# Patient Record
Sex: MALE | URBAN - METROPOLITAN AREA
[De-identification: names, ages, dates, MRNs, and addresses within clinical notes are randomized per-mention and may not be internally consistent; named-entity substitution may affect disease eponyms.]

---

## 2018-05-01 ENCOUNTER — APPOINTMENT (RX ONLY)
Dept: URBAN - METROPOLITAN AREA CLINIC 57 | Facility: CLINIC | Age: 48
Setting detail: DERMATOLOGY
End: 2018-05-01

## 2018-05-01 DIAGNOSIS — L40.4 GUTTATE PSORIASIS: ICD-10-CM

## 2018-05-01 PROBLEM — L40.0 PSORIASIS VULGARIS: Status: ACTIVE | Noted: 2018-05-01

## 2018-05-01 PROBLEM — J30.1 ALLERGIC RHINITIS DUE TO POLLEN: Status: ACTIVE | Noted: 2018-05-01

## 2018-05-01 PROBLEM — L85.3 XEROSIS CUTIS: Status: ACTIVE | Noted: 2018-05-01

## 2018-05-01 PROBLEM — L29.8 OTHER PRURITUS: Status: ACTIVE | Noted: 2018-05-01

## 2018-05-01 PROCEDURE — 99202 OFFICE O/P NEW SF 15 MIN: CPT

## 2018-05-01 PROCEDURE — ? COUNSELING

## 2018-05-01 PROCEDURE — ? PRESCRIPTION

## 2018-05-01 PROCEDURE — ? OTHER

## 2018-05-01 PROCEDURE — ? TREATMENT REGIMEN

## 2018-05-01 RX ORDER — CALCIPOTRIENE 50 UG/G
OINTMENT TOPICAL BID
Qty: 1 | Refills: 4 | Status: ERX | COMMUNITY
Start: 2018-05-01

## 2018-05-01 RX ORDER — FLUOCINONIDE 0.5 MG/ML
OINTMENT TOPICAL BID
Qty: 1 | Refills: 3 | Status: ERX | COMMUNITY
Start: 2018-05-01

## 2018-05-01 RX ADMIN — CALCIPOTRIENE: 50 OINTMENT TOPICAL at 16:50

## 2018-05-01 RX ADMIN — FLUOCINONIDE: 0.5 OINTMENT TOPICAL at 16:48

## 2018-05-01 ASSESSMENT — LOCATION DETAILED DESCRIPTION DERM
LOCATION DETAILED: LEFT DORSAL FOOT
LOCATION DETAILED: LEFT ULNAR DORSAL HAND
LOCATION DETAILED: LEFT ANTERIOR DISTAL THIGH
LOCATION DETAILED: RIGHT DISTAL POSTERIOR UPPER ARM
LOCATION DETAILED: PERIUMBILICAL SKIN
LOCATION DETAILED: RIGHT ULNAR DORSAL HAND
LOCATION DETAILED: RIGHT DORSAL FOOT
LOCATION DETAILED: SUPERIOR THORACIC SPINE
LOCATION DETAILED: LEFT DISTAL POSTERIOR UPPER ARM
LOCATION DETAILED: RIGHT ANTERIOR PROXIMAL THIGH

## 2018-05-01 ASSESSMENT — LOCATION SIMPLE DESCRIPTION DERM
LOCATION SIMPLE: LEFT THIGH
LOCATION SIMPLE: LEFT UPPER ARM
LOCATION SIMPLE: RIGHT UPPER ARM
LOCATION SIMPLE: RIGHT FOOT
LOCATION SIMPLE: UPPER BACK
LOCATION SIMPLE: RIGHT HAND
LOCATION SIMPLE: ABDOMEN
LOCATION SIMPLE: RIGHT THIGH
LOCATION SIMPLE: LEFT FOOT
LOCATION SIMPLE: LEFT HAND

## 2018-05-01 ASSESSMENT — LOCATION ZONE DERM
LOCATION ZONE: TRUNK
LOCATION ZONE: FEET
LOCATION ZONE: HAND
LOCATION ZONE: ARM
LOCATION ZONE: LEG

## 2018-05-01 ASSESSMENT — PGA PSORIASIS: PGA PSORIASIS: MILD (MILD PLAQUE ELEVATION, LIGHT ERYTHEMA, FINE SCALE PREDOMINATES)

## 2018-05-01 NOTE — HPI: RASH (PSORIASIS)
Do You Have A Family History Of Psoriasis?: no
How Severe Is Your Psoriasis?: moderate
Is This A New Presentation, Or A Follow-Up?: Psoriasis
Additional History: Patient has been on Humira for 8-9 years

## 2018-05-01 NOTE — PROCEDURE: OTHER
Other (Free Text): Pt first diagnosed with psoriasis at 12. It resolved and then flared again later in life. In January patient states that [place]. After this time, the significant stress caused his psoriasis to flare in February. In March the patient was prescribed a taper of prednisone starting at 50 mg (Mar 2-23rd). \\n\\nDiscussed changing biologic to Stelara. Pt has concerns about upsetting GI symptoms as he has Crohn’s. He has also lost a significant portion of his colon. \\nDiscussed other treatment options including : Otezla-  causes many GI issues including as diarrhea; Soriatane\\nPatient declines starting phototherapy treatment again.\\nWill consider trying IM Methotrexate (as likely wont absorb po) or Stelara. Pt will follow-up with GI Doctor\\n\\nHas tried and failed: Remicade, Olux foam, Clobetasol spray, phototherapy\\nCurrently on Humira which seems to control GI symptoms but psoriasis is still flaring
Detail Level: Zone
Note Text (......Xxx Chief Complaint.): This diagnosis correlates with the

## 2018-05-01 NOTE — PROCEDURE: TREATMENT REGIMEN
Samples Given: Sernivo spray, Topicort spray
Continue Regimen: Humira 40 mg/0.8 mL q2w
Action 1: Continue
Start Regimen: Fluocinonide ointment - aaa body twice daily Monday-Friday x2-3 weeks. Then apply twice daily on Saturday and Sunday x2-3 weeks.\\nCalcipotriene ointment - aaa body twice daily on Saturday and Sunday x2-3 weeks.  Then apply twice daily Monday-Friday x2-3 weeks
Detail Level: Zone
Other Instructions: Rx discussing tx options of Stelara and Methotrexate with GI doctor. Can use sprays, and calcipotriene on ears as well\\n\\nDiscussed that oral steroids (prednisone) can cause psoriasis to flare and/or cause pustular psoriasis especially when tapering down.

## 2018-05-22 ENCOUNTER — APPOINTMENT (RX ONLY)
Dept: URBAN - METROPOLITAN AREA CLINIC 73 | Facility: CLINIC | Age: 48
Setting detail: DERMATOLOGY
End: 2018-05-22

## 2018-05-22 DIAGNOSIS — L40.4 GUTTATE PSORIASIS: ICD-10-CM | Status: IMPROVED

## 2018-05-22 PROCEDURE — ? TREATMENT REGIMEN

## 2018-05-22 PROCEDURE — ? OTHER

## 2018-05-22 PROCEDURE — ? COUNSELING

## 2018-05-22 PROCEDURE — 99213 OFFICE O/P EST LOW 20 MIN: CPT

## 2018-05-22 PROCEDURE — ? PRESCRIPTION

## 2018-05-22 RX ORDER — CALCIPOTRIENE AND BETAMETHASONE DIPROPIONATE 50; .5 UG/G; MG/G
AEROSOL, FOAM TOPICAL
Qty: 2 | Refills: 4 | Status: ERX

## 2018-05-22 RX ORDER — CALCIPOTRIENE AND BETAMETHASONE DIPROPIONATE 50; .5 UG/G; MG/G
AEROSOL, FOAM TOPICAL
Qty: 2 | Refills: 4 | Status: ERX | COMMUNITY
Start: 2018-05-22

## 2018-05-22 RX ADMIN — CALCIPOTRIENE AND BETAMETHASONE DIPROPIONATE: 50; .5 AEROSOL, FOAM TOPICAL at 16:04

## 2018-05-22 ASSESSMENT — LOCATION SIMPLE DESCRIPTION DERM
LOCATION SIMPLE: RIGHT FOREARM
LOCATION SIMPLE: SCALP
LOCATION SIMPLE: LEFT FOREARM

## 2018-05-22 ASSESSMENT — PGA PSORIASIS: PGA PSORIASIS: MINIMAL (MINIMAL PLAQUE ELEVATION, FAINT ERYTHEMA, OCCASIONAL FINE SCALE)

## 2018-05-22 ASSESSMENT — LOCATION DETAILED DESCRIPTION DERM
LOCATION DETAILED: LEFT PROXIMAL DORSAL FOREARM
LOCATION DETAILED: RIGHT PROXIMAL DORSAL FOREARM
LOCATION DETAILED: RIGHT SUPERIOR PARIETAL SCALP

## 2018-05-22 ASSESSMENT — LOCATION ZONE DERM
LOCATION ZONE: SCALP
LOCATION ZONE: ARM

## 2018-05-22 NOTE — PROCEDURE: TREATMENT REGIMEN
Start Regimen: Sent to [facility] Enstilar foam apply to affected areas on body daily x 4 weeks
Action 3: Continue
Hold Regimen: Use only if Enstilar not available.\\nCalcipotriene ointment .005% Apply to affected areas once-twice daily\\nFluocinonide ointment .05% Apply to affected areas twice a day x2-3 weeks then stop, continue with flares
Detail Level: Zone
Samples Given: Topicort spray\\nSernivo spray
Continue Regimen: Enstilar daily x 4 weeks.
Other Instructions: ->patient was not comfortable with the ointment, patient wants the prescription in a cream or foam vehicle\\n->patient has not called his GI physician \\n->patient will see his GI physician in 2 months, okay to wait until next ov \\n->patient informed just because humira didn't control psoriasis, does not mean it is not controlling Crohn's disease\\n->patient will discuss treatment options to help with skin along with Crohn's disease as he is Crohn's still flares\\n->informed Pt Cosentyx and Taltz helps with psoriasis but do not recommend as can exacerbate IBD\\n-> Would recommend going on Stelara as alternative to Humira\\n->patient has tried Clobex, was covered by insurance\\n->abdomen and back are improved. \\n->if Enstilar is too expensive, will send Cobex\\n->okay to use Topicort spray, fluocinonide ointment, enstilar, or sernivo on ears\\n->informed patient fluocinonide is too strong his face\\n->informed patient Enstilar contains topical steroid and calcipotriene

## 2018-05-22 NOTE — PROCEDURE: OTHER
Other (Free Text): Has improved on topical calcipotriene and fluocinonide.  Had been using cloebtasol in the past.  Disc would like to have him not have to use topicals if on a biologic.  He started Humira for his Crohn's.  Disc to talk to his GI about possibly switching to stelara.
Detail Level: Zone
Note Text (......Xxx Chief Complaint.): This diagnosis correlates with the

## 2018-05-22 NOTE — HPI: RASH (PSORIASIS)
How Severe Is Your Psoriasis?: moderate
Is This A New Presentation, Or A Follow-Up?: Follow Up Psoriasis
Additional History: Improvement using creams but the leave an oily reside